# Patient Record
(demographics unavailable — no encounter records)

---

## 2024-10-08 NOTE — ASSESSMENT
[FreeTextEntry1] :  Desirae has hypertension anxiety yeah I think I can you make me a tuna tomato is 60 years old with a history of hypertension on stable medication well-controlled at home but always elevated in the physician's office.  She has a nonspecific sinus tachycardiaAgain only in the doctor's office.    She remains quite stable on the present medications   She has no significant complaints and has a normal physical exam.

## 2024-10-08 NOTE — DISCUSSION/SUMMARY
[FreeTextEntry1] : 1.  Full blood work drawn at her request\  2.  Continue present regimen of medication 3.  No further cardiac workup needed presently  Once results of the blood is available I will discuss them in detail with the patient Routine follow with me again in 4 months [EKG obtained to assist in diagnosis and management of assessed problem(s)] : EKG obtained to assist in diagnosis and management of assessed problem(s)

## 2024-10-08 NOTE — PHYSICAL EXAM
[Normal Conjunctiva] : normal conjunctiva [No Carotid Bruit] : no carotid bruit [Normal S1, S2] : normal S1, S2 [No Murmur] : no murmur [Clear Lung Fields] : clear lung fields [Soft] : abdomen soft [Non Tender] : non-tender [Normal Gait] : normal gait [Moves all extremities] : moves all extremities [Alert and Oriented] : alert and oriented [de-identified] : Blood pressure at home mostly 120-130/80 which she takes frequently [de-identified] :  no rales rhonchi or wheezes

## 2024-10-08 NOTE — REVIEW OF SYSTEMS
[Joint Pain] : joint pain [SOB] : no shortness of breath [Dyspnea on exertion] : not dyspnea during exertion [Lower Ext Edema] : no extremity edema [Leg Claudication] : no intermittent leg claudication [Cough] : no cough [Wheezing] : no wheezing [Abdominal Pain] : no abdominal pain [Nausea] : no nausea [Dysuria] : no dysuria [Dizziness] : no dizziness

## 2024-10-08 NOTE — REASON FOR VISIT
[FreeTextEntry1] : darren Pappas 60 years old previous patient of Dr. VANESSA Pichardo here for reevaluation of her cardiac status.  I reviewed the patient's chart previous noninvasive testing complete history and physical exam

## 2024-10-08 NOTE — HISTORY OF PRESENT ILLNESS
[FreeTextEntry1] : Desirae is 60 years old with a history of hypertension relatively well-controlled at home, history of a sinus tachycardia particularly when she is in a physician's office, elevation of blood pressure again when she is in a physician's office.  She has no cardiac complaints of chest pain chest pressure shortness of breath palpitations or dizziness.  She does have a history of anxiety.  She works as a  and is on her feet all day and is fairly active.  Present medications include alprazolam amlodipine 7.5 mg a day chlorthalidone 25 mg losartan 100 mg omeprazole 40  EKG October 8, 2024 normal sinus rhythm within normal limits 2D echo 2023 normal LV and RV function no evidence of LVH no valvular disease normal ejection fraction

## 2025-01-25 NOTE — HEALTH RISK ASSESSMENT
[Good] : ~his/her~  mood as  good [No] : In the past 12 months have you used drugs other than those required for medical reasons? No [No falls in past year] : Patient reported no falls in the past year [0] : 2) Feeling down, depressed, or hopeless: Not at all (0) [Never] : Never [Patient reported PAP Smear was normal] : Patient reported PAP Smear was normal [Patient reported mammogram was normal] : Patient reported mammogram was normal [Patient declined bone density test] : Patient declined bone density test [Employed] : employed [] :  [Fully functional (bathing, dressing, toileting, transferring, walking, feeding)] : Fully functional (bathing, dressing, toileting, transferring, walking, feeding) [Fully functional (using the telephone, shopping, preparing meals, housekeeping, doing laundry, using] : Fully functional and needs no help or supervision to perform IADLs (using the telephone, shopping, preparing meals, housekeeping, doing laundry, using transportation, managing medications and managing finances) [Carbon Monoxide Detector] : carbon monoxide detector [Smoke Detector] : smoke detector [Seat Belt] :  uses seat belt [Sunscreen] : uses sunscreen [de-identified] : walking at work  [[id number]] : 0 [de-identified] : regular [Reports changes in vision] : Reports no changes in vision [Reports changes in hearing] : Reports no changes in hearing [Reports changes in dental health] : Reports no changes in dental health [TB Exposure] : is not being exposed to tuberculosis [MammogramDate] : 2024 [PapSmearDate] : 2024 [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

## 2025-01-25 NOTE — PHYSICAL EXAM
[No Acute Distress] : no acute distress [Well Nourished] : well nourished [Well Developed] : well developed [Well-Appearing] : well-appearing [Normal Sclera/Conjunctiva] : normal sclera/conjunctiva [PERRL] : pupils equal round and reactive to light [EOMI] : extraocular movements intact [Normal Outer Ear/Nose] : the outer ears and nose were normal in appearance [Normal Oropharynx] : the oropharynx was normal [No JVD] : no jugular venous distention [No Lymphadenopathy] : no lymphadenopathy [Supple] : supple [Thyroid Normal, No Nodules] : the thyroid was normal and there were no nodules present [No Respiratory Distress] : no respiratory distress  [No Accessory Muscle Use] : no accessory muscle use [Clear to Auscultation] : lungs were clear to auscultation bilaterally [Normal Rate] : normal rate  [Normal S1, S2] : normal S1 and S2 [Regular Rhythm] : with a regular rhythm [No Murmur] : no murmur heard [No Carotid Bruits] : no carotid bruits [No Abdominal Bruit] : a ~M bruit was not heard ~T in the abdomen [No Varicosities] : no varicosities [Pedal Pulses Present] : the pedal pulses are present [No Edema] : there was no peripheral edema [No Extremity Clubbing/Cyanosis] : no extremity clubbing/cyanosis [No Palpable Aorta] : no palpable aorta [Soft] : abdomen soft [Non Tender] : non-tender [Non-distended] : non-distended [No Masses] : no abdominal mass palpated [No HSM] : no HSM [Normal Bowel Sounds] : normal bowel sounds [Normal Posterior Cervical Nodes] : no posterior cervical lymphadenopathy [Normal Anterior Cervical Nodes] : no anterior cervical lymphadenopathy [No CVA Tenderness] : no CVA  tenderness [No Spinal Tenderness] : no spinal tenderness [No Joint Swelling] : no joint swelling [Grossly Normal Strength/Tone] : grossly normal strength/tone [No Rash] : no rash [Coordination Grossly Intact] : coordination grossly intact [No Focal Deficits] : no focal deficits [Normal Gait] : normal gait [Deep Tendon Reflexes (DTR)] : deep tendon reflexes were 2+ and symmetric [Normal Affect] : the affect was normal [Normal Insight/Judgement] : insight and judgment were intact

## 2025-01-25 NOTE — HISTORY OF PRESENT ILLNESS
[FreeTextEntry1] : CPE [de-identified] : Pt reports feeling well today.  C/o weight gain. Under cardiologist care. Recent cardiac exam was wnl.

## 2025-02-18 NOTE — REASON FOR VISIT
[FreeTextEntry1] : Desirae Pappas 61 years old here for routine follow-up of her cardiac status.She was seen on February 18, 2025

## 2025-02-18 NOTE — PHYSICAL EXAM
[Normal Conjunctiva] : normal conjunctiva [No Carotid Bruit] : no carotid bruit [Normal S1, S2] : normal S1, S2 [No Murmur] : no murmur [Clear Lung Fields] : clear lung fields [Soft] : abdomen soft [Non Tender] : non-tender [Normal Gait] : normal gait [Moves all extremities] : moves all extremities [Alert and Oriented] : alert and oriented [No Edema] : no edema [Normal DP B/L] : normal DP B/L [de-identified] :  no rales rhonchi or wheezes [de-identified] : Blood pressure at home mostly 120-130/80 which she takes frequentlyBlood pressure today is 1140/80

## 2025-02-18 NOTE — HISTORY OF PRESENT ILLNESS
[FreeTextEntry1] : Desirae is 61 years old with a history of hypertension relatively well-controlled at home, history of a sinus tachycardia particularly when she is in a physician's office, elevation of blood pressure again when she is in a physician's office.  She has no cardiac complaints of chest pain chest pressure shortness of breath palpitations or dizziness.  She does have a history of anxiety.  She works as a  and is on her feet all day and is fairly active.  Present medications include alprazolam amlodipine 7.5 mg a day chlorthalidone 25 mg losartan 100 mg omeprazole 40  EKG October 8, 2024 normal sinus rhythm within normal limits 2D echo 2023 normal LV and RV function no evidence of LVH no valvular disease normal ejection fraction  Visit February 18, 2025 The patient has anxiety and tends to run when she sees a physician a slightly high blood pressure and has a sinus tachycardiaFor her blood pressure presently she is on losartan 100 and amlodipine 7.5 mg a day.  We recently started her on Crestor for an elevated LDL of 170.  She denies chest pain chest pressure shortness of breath.  She denies palpitations.  Blood pressure initially was 150/80 with a pulse of 95 but later was 140/80 with a pulse of 80 Blood test January 2025 Hemoglobin A1c 5.9Vitamin D 33.3TSH 5.10 but T3-T4 normal Lipid panel triglycerides 67 cholesterol 149HDL 60 and LDL now 76 down from 170

## 2025-02-18 NOTE — DISCUSSION/SUMMARY
[FreeTextEntry1] : I reassured the patient that her cardiac status was stable.  Her lipid panel is excellent.  She should continue her present regimen of medication continue to be careful with salt in her diet.  I encouraged her to begin to exercise more frequently when the weather improves.  Routine follow-up again in 4 months